# Patient Record
Sex: FEMALE | URBAN - METROPOLITAN AREA
[De-identification: names, ages, dates, MRNs, and addresses within clinical notes are randomized per-mention and may not be internally consistent; named-entity substitution may affect disease eponyms.]

---

## 2023-03-27 ENCOUNTER — TELEPHONE (OUTPATIENT)
Dept: PAIN MANAGEMENT | Age: 53
End: 2023-03-27

## 2023-04-07 ENCOUNTER — OFFICE VISIT (OUTPATIENT)
Dept: PAIN MANAGEMENT | Age: 53
End: 2023-04-07
Payer: COMMERCIAL

## 2023-04-07 VITALS
BODY MASS INDEX: 20.83 KG/M2 | TEMPERATURE: 97.5 F | DIASTOLIC BLOOD PRESSURE: 80 MMHG | SYSTOLIC BLOOD PRESSURE: 136 MMHG | HEIGHT: 65 IN | OXYGEN SATURATION: 100 % | WEIGHT: 125 LBS | HEART RATE: 94 BPM

## 2023-04-07 DIAGNOSIS — M47.817 LUMBOSACRAL SPONDYLOSIS WITHOUT MYELOPATHY: ICD-10-CM

## 2023-04-07 DIAGNOSIS — M17.12 PRIMARY OSTEOARTHRITIS OF LEFT KNEE: Primary | ICD-10-CM

## 2023-04-07 PROCEDURE — 99213 OFFICE O/P EST LOW 20 MIN: CPT | Performed by: PAIN MEDICINE

## 2023-04-07 PROCEDURE — 3017F COLORECTAL CA SCREEN DOC REV: CPT | Performed by: PAIN MEDICINE

## 2023-04-07 PROCEDURE — G8427 DOCREV CUR MEDS BY ELIG CLIN: HCPCS | Performed by: PAIN MEDICINE

## 2023-04-07 PROCEDURE — 1036F TOBACCO NON-USER: CPT | Performed by: PAIN MEDICINE

## 2023-04-07 PROCEDURE — G8420 CALC BMI NORM PARAMETERS: HCPCS | Performed by: PAIN MEDICINE

## 2023-04-07 RX ORDER — ALBUTEROL SULFATE 90 UG/1
AEROSOL, METERED RESPIRATORY (INHALATION)
COMMUNITY
Start: 2023-03-11

## 2023-04-07 RX ORDER — MIRTAZAPINE 30 MG/1
1 TABLET, FILM COATED ORAL DAILY
COMMUNITY
Start: 2019-04-19

## 2023-04-07 RX ORDER — SENNOSIDES 8.6 MG
650 CAPSULE ORAL EVERY 8 HOURS PRN
COMMUNITY
Start: 2023-03-15

## 2023-04-07 RX ORDER — PERPHENAZINE 16 MG
TABLET ORAL
COMMUNITY
Start: 2023-03-28

## 2023-04-07 RX ORDER — GABAPENTIN 400 MG/1
CAPSULE ORAL
COMMUNITY
Start: 2023-03-20

## 2023-04-07 RX ORDER — AMLODIPINE BESYLATE 5 MG/1
5 TABLET ORAL DAILY
COMMUNITY
Start: 2022-05-18

## 2023-04-07 RX ORDER — IBUPROFEN 800 MG/1
TABLET ORAL
COMMUNITY
Start: 2022-09-26

## 2023-04-07 RX ORDER — LISINOPRIL 40 MG/1
40 TABLET ORAL DAILY
COMMUNITY
Start: 2022-11-18

## 2023-04-07 RX ORDER — SERTRALINE HYDROCHLORIDE 100 MG/1
100 TABLET, FILM COATED ORAL DAILY
COMMUNITY
Start: 2022-08-19

## 2023-04-07 RX ORDER — ATORVASTATIN CALCIUM 40 MG/1
40 TABLET, FILM COATED ORAL DAILY
COMMUNITY
Start: 2022-05-18

## 2023-04-07 RX ORDER — ALPRAZOLAM 1 MG/1
TABLET ORAL
COMMUNITY
Start: 2023-03-16

## 2023-04-07 RX ORDER — ONDANSETRON 4 MG/1
TABLET, ORALLY DISINTEGRATING ORAL
COMMUNITY
Start: 2023-02-14

## 2023-04-07 RX ORDER — BUDESONIDE AND FORMOTEROL FUMARATE DIHYDRATE 160; 4.5 UG/1; UG/1
AEROSOL RESPIRATORY (INHALATION)
COMMUNITY
Start: 2022-03-25

## 2023-04-07 RX ORDER — LEVOFLOXACIN 500 MG/1
500 TABLET, FILM COATED ORAL DAILY
COMMUNITY
Start: 2023-02-23

## 2023-04-07 RX ORDER — METOPROLOL SUCCINATE 100 MG/1
100 TABLET, EXTENDED RELEASE ORAL DAILY
COMMUNITY
Start: 2022-04-12

## 2023-04-07 RX ORDER — CETIRIZINE HCL 1 MG/ML
10 SOLUTION, ORAL ORAL DAILY
COMMUNITY
Start: 2023-02-17

## 2023-04-07 ASSESSMENT — ENCOUNTER SYMPTOMS
RESPIRATORY NEGATIVE: 1
ALLERGIC/IMMUNOLOGIC NEGATIVE: 1
GASTROINTESTINAL NEGATIVE: 1
EYES NEGATIVE: 1

## 2023-04-07 NOTE — PROGRESS NOTES
movements intact. Conjunctiva/sclera: Conjunctivae normal.      Pupils: Pupils are equal, round, and reactive to light. Cardiovascular:      Rate and Rhythm: Normal rate and regular rhythm. Pulses: Normal pulses. Heart sounds: Normal heart sounds. Pulmonary:      Effort: Pulmonary effort is normal.      Breath sounds: Normal breath sounds. Abdominal:      General: Abdomen is flat. Bowel sounds are normal.      Palpations: Abdomen is soft. Musculoskeletal:         General: Normal range of motion. Cervical back: Normal range of motion and neck supple. Skin:     General: Skin is warm. Capillary Refill: Capillary refill takes less than 2 seconds. Neurological:      General: No focal deficit present. Mental Status: She is alert and oriented to person, place, and time. Mental status is at baseline. Psychiatric:         Mood and Affect: Mood normal.         Behavior: Behavior normal.         Thought Content: Thought content normal.         Judgment: Judgment normal.         Plan   Discussed combination of Narcotics and Xanax advised to stop thus and start small dose Narcotics. Discussed Knee joint injections streroids vs Gel injections.

## 2024-09-10 ENCOUNTER — OFFICE VISIT (OUTPATIENT)
Age: 54
End: 2024-09-10
Payer: COMMERCIAL

## 2024-09-10 VITALS
WEIGHT: 134.5 LBS | DIASTOLIC BLOOD PRESSURE: 74 MMHG | HEIGHT: 65 IN | BODY MASS INDEX: 22.41 KG/M2 | TEMPERATURE: 97.1 F | SYSTOLIC BLOOD PRESSURE: 136 MMHG

## 2024-09-10 DIAGNOSIS — M47.817 LUMBOSACRAL SPONDYLOSIS WITHOUT MYELOPATHY: ICD-10-CM

## 2024-09-10 DIAGNOSIS — M17.12 PRIMARY OSTEOARTHRITIS OF LEFT KNEE: Primary | ICD-10-CM

## 2024-09-10 DIAGNOSIS — M16.11 PRIMARY OSTEOARTHRITIS OF RIGHT HIP: ICD-10-CM

## 2024-09-10 DIAGNOSIS — M46.1 BILATERAL SACROILIITIS (HCC): ICD-10-CM

## 2024-09-10 DIAGNOSIS — M17.11 OSTEOARTHRITIS OF RIGHT KNEE, UNSPECIFIED OSTEOARTHRITIS TYPE: ICD-10-CM

## 2024-09-10 DIAGNOSIS — M21.851 HIP DYSPLASIA, ACQUIRED, RIGHT: ICD-10-CM

## 2024-09-10 PROCEDURE — 20610 DRAIN/INJ JOINT/BURSA W/O US: CPT | Performed by: PAIN MEDICINE

## 2024-09-10 PROCEDURE — G8427 DOCREV CUR MEDS BY ELIG CLIN: HCPCS | Performed by: PAIN MEDICINE

## 2024-09-10 PROCEDURE — 99213 OFFICE O/P EST LOW 20 MIN: CPT | Performed by: PAIN MEDICINE

## 2024-09-10 PROCEDURE — 99215 OFFICE O/P EST HI 40 MIN: CPT

## 2024-09-10 PROCEDURE — G8420 CALC BMI NORM PARAMETERS: HCPCS | Performed by: PAIN MEDICINE

## 2024-09-10 PROCEDURE — 1036F TOBACCO NON-USER: CPT | Performed by: PAIN MEDICINE

## 2024-09-10 PROCEDURE — 3017F COLORECTAL CA SCREEN DOC REV: CPT | Performed by: PAIN MEDICINE

## 2024-09-10 RX ORDER — BUPIVACAINE HYDROCHLORIDE 5 MG/ML
30 INJECTION, SOLUTION PERINEURAL ONCE
Status: COMPLETED | OUTPATIENT
Start: 2024-09-10 | End: 2024-09-10

## 2024-09-10 RX ORDER — PROMETHAZINE HYDROCHLORIDE 25 MG/1
25 TABLET ORAL EVERY 6 HOURS PRN
COMMUNITY
Start: 2024-02-24

## 2024-09-10 RX ORDER — TRIAMCINOLONE ACETONIDE 40 MG/ML
40 INJECTION, SUSPENSION INTRA-ARTICULAR; INTRAMUSCULAR ONCE
Status: COMPLETED | OUTPATIENT
Start: 2024-09-10 | End: 2024-09-10

## 2024-09-10 RX ORDER — PANTOPRAZOLE SODIUM 40 MG/1
40 TABLET, DELAYED RELEASE ORAL 2 TIMES DAILY
COMMUNITY
Start: 2024-03-28

## 2024-09-10 RX ORDER — GABAPENTIN 600 MG/1
600 TABLET ORAL
COMMUNITY
Start: 2024-08-04

## 2024-09-10 RX ORDER — IPRATROPIUM BROMIDE AND ALBUTEROL SULFATE 2.5; .5 MG/3ML; MG/3ML
3 SOLUTION RESPIRATORY (INHALATION) EVERY 6 HOURS PRN
COMMUNITY
Start: 2024-06-12

## 2024-09-10 RX ORDER — ONDANSETRON 4 MG/1
4 TABLET, FILM COATED ORAL EVERY 8 HOURS PRN
COMMUNITY
Start: 2024-07-18

## 2024-09-10 RX ORDER — GABAPENTIN 600 MG/1
1200 TABLET ORAL 3 TIMES DAILY
Qty: 180 TABLET | Refills: 5 | Status: SHIPPED | OUTPATIENT
Start: 2024-09-10 | End: 2024-10-10

## 2024-09-10 RX ORDER — OXYCODONE AND ACETAMINOPHEN 5; 325 MG/1; MG/1
1 TABLET ORAL EVERY 8 HOURS PRN
Qty: 21 TABLET | Refills: 0 | Status: SHIPPED | OUTPATIENT
Start: 2024-09-10 | End: 2024-09-17

## 2024-09-10 RX ORDER — METHOCARBAMOL 750 MG/1
TABLET, FILM COATED ORAL
COMMUNITY

## 2024-09-10 RX ORDER — GABAPENTIN 400 MG/1
CAPSULE ORAL
Qty: 90 CAPSULE | Status: CANCELLED | OUTPATIENT
Start: 2024-09-10

## 2024-09-10 RX ORDER — AZITHROMYCIN 250 MG/1
TABLET, FILM COATED ORAL
COMMUNITY
Start: 2024-07-23

## 2024-09-10 RX ORDER — FLUTICASONE PROPIONATE 50 MCG
SPRAY, SUSPENSION (ML) NASAL
COMMUNITY
Start: 2024-06-28

## 2024-09-10 RX ADMIN — TRIAMCINOLONE ACETONIDE 40 MG: 40 INJECTION, SUSPENSION INTRA-ARTICULAR; INTRAMUSCULAR at 11:25

## 2024-09-10 RX ADMIN — BUPIVACAINE HYDROCHLORIDE 150 MG: 5 INJECTION, SOLUTION PERINEURAL at 11:24

## 2024-09-10 ASSESSMENT — ENCOUNTER SYMPTOMS
GASTROINTESTINAL NEGATIVE: 1
RESPIRATORY NEGATIVE: 1
ALLERGIC/IMMUNOLOGIC NEGATIVE: 1
EYES NEGATIVE: 1

## 2024-09-17 ENCOUNTER — OFFICE VISIT (OUTPATIENT)
Age: 54
End: 2024-09-17
Payer: COMMERCIAL

## 2024-09-17 VITALS
TEMPERATURE: 97.3 F | HEIGHT: 65 IN | BODY MASS INDEX: 22.33 KG/M2 | DIASTOLIC BLOOD PRESSURE: 110 MMHG | WEIGHT: 134 LBS | SYSTOLIC BLOOD PRESSURE: 168 MMHG

## 2024-09-17 DIAGNOSIS — M16.11 PRIMARY OSTEOARTHRITIS OF RIGHT HIP: ICD-10-CM

## 2024-09-17 DIAGNOSIS — M21.851 HIP DYSPLASIA, ACQUIRED, RIGHT: ICD-10-CM

## 2024-09-17 DIAGNOSIS — M17.12 PRIMARY OSTEOARTHRITIS OF LEFT KNEE: ICD-10-CM

## 2024-09-17 DIAGNOSIS — M47.817 LUMBOSACRAL SPONDYLOSIS WITHOUT MYELOPATHY: ICD-10-CM

## 2024-09-17 DIAGNOSIS — M17.11 OSTEOARTHRITIS OF RIGHT KNEE, UNSPECIFIED OSTEOARTHRITIS TYPE: ICD-10-CM

## 2024-09-17 PROCEDURE — 99213 OFFICE O/P EST LOW 20 MIN: CPT | Performed by: PAIN MEDICINE

## 2024-09-17 PROCEDURE — 99214 OFFICE O/P EST MOD 30 MIN: CPT

## 2024-09-17 PROCEDURE — 3017F COLORECTAL CA SCREEN DOC REV: CPT | Performed by: PAIN MEDICINE

## 2024-09-17 PROCEDURE — G8427 DOCREV CUR MEDS BY ELIG CLIN: HCPCS | Performed by: PAIN MEDICINE

## 2024-09-17 PROCEDURE — 1036F TOBACCO NON-USER: CPT | Performed by: PAIN MEDICINE

## 2024-09-17 PROCEDURE — G8420 CALC BMI NORM PARAMETERS: HCPCS | Performed by: PAIN MEDICINE

## 2024-09-17 RX ORDER — OXYCODONE AND ACETAMINOPHEN 5; 325 MG/1; MG/1
1 TABLET ORAL EVERY 8 HOURS PRN
Qty: 42 TABLET | Refills: 0 | Status: SHIPPED | OUTPATIENT
Start: 2024-09-17 | End: 2024-10-01

## 2024-09-17 ASSESSMENT — ENCOUNTER SYMPTOMS
GASTROINTESTINAL NEGATIVE: 1
EYES NEGATIVE: 1
RESPIRATORY NEGATIVE: 1
ALLERGIC/IMMUNOLOGIC NEGATIVE: 1

## 2024-09-27 ENCOUNTER — OFFICE VISIT (OUTPATIENT)
Age: 54
End: 2024-09-27
Payer: COMMERCIAL

## 2024-09-27 VITALS
DIASTOLIC BLOOD PRESSURE: 88 MMHG | SYSTOLIC BLOOD PRESSURE: 140 MMHG | WEIGHT: 132 LBS | BODY MASS INDEX: 21.99 KG/M2 | HEIGHT: 65 IN

## 2024-09-27 DIAGNOSIS — M96.1 LUMBAR POST-LAMINECTOMY SYNDROME: ICD-10-CM

## 2024-09-27 DIAGNOSIS — M47.817 LUMBOSACRAL SPONDYLOSIS WITHOUT MYELOPATHY: ICD-10-CM

## 2024-09-27 DIAGNOSIS — M47.812 CERVICAL SPONDYLOSIS WITHOUT MYELOPATHY: Primary | ICD-10-CM

## 2024-09-27 DIAGNOSIS — M54.16 LUMBAR RADICULOPATHY: ICD-10-CM

## 2024-09-27 DIAGNOSIS — M51.36 DDD (DEGENERATIVE DISC DISEASE), LUMBAR: ICD-10-CM

## 2024-09-27 PROCEDURE — 3017F COLORECTAL CA SCREEN DOC REV: CPT | Performed by: PAIN MEDICINE

## 2024-09-27 PROCEDURE — 99215 OFFICE O/P EST HI 40 MIN: CPT

## 2024-09-27 PROCEDURE — G8420 CALC BMI NORM PARAMETERS: HCPCS | Performed by: PAIN MEDICINE

## 2024-09-27 PROCEDURE — 99213 OFFICE O/P EST LOW 20 MIN: CPT | Performed by: PAIN MEDICINE

## 2024-09-27 PROCEDURE — 1036F TOBACCO NON-USER: CPT | Performed by: PAIN MEDICINE

## 2024-09-27 PROCEDURE — G8427 DOCREV CUR MEDS BY ELIG CLIN: HCPCS | Performed by: PAIN MEDICINE

## 2024-09-27 RX ORDER — OXYCODONE HYDROCHLORIDE 5 MG/1
5 TABLET ORAL EVERY 6 HOURS PRN
Qty: 21 TABLET | Refills: 0 | Status: SHIPPED | OUTPATIENT
Start: 2024-09-27 | End: 2024-10-04

## 2024-09-27 ASSESSMENT — ENCOUNTER SYMPTOMS
EYES NEGATIVE: 1
GASTROINTESTINAL NEGATIVE: 1
RESPIRATORY NEGATIVE: 1
ALLERGIC/IMMUNOLOGIC NEGATIVE: 1

## 2024-10-04 ENCOUNTER — OFFICE VISIT (OUTPATIENT)
Age: 54
End: 2024-10-04
Payer: COMMERCIAL

## 2024-10-04 VITALS
TEMPERATURE: 97.7 F | DIASTOLIC BLOOD PRESSURE: 74 MMHG | WEIGHT: 132 LBS | HEIGHT: 65 IN | SYSTOLIC BLOOD PRESSURE: 126 MMHG | BODY MASS INDEX: 21.99 KG/M2

## 2024-10-04 DIAGNOSIS — M54.16 LUMBAR RADICULOPATHY: ICD-10-CM

## 2024-10-04 DIAGNOSIS — M47.817 LUMBOSACRAL SPONDYLOSIS WITHOUT MYELOPATHY: ICD-10-CM

## 2024-10-04 DIAGNOSIS — M47.812 CERVICAL SPONDYLOSIS WITHOUT MYELOPATHY: ICD-10-CM

## 2024-10-04 DIAGNOSIS — M96.1 LUMBAR POST-LAMINECTOMY SYNDROME: ICD-10-CM

## 2024-10-04 PROCEDURE — 99213 OFFICE O/P EST LOW 20 MIN: CPT | Performed by: PAIN MEDICINE

## 2024-10-04 PROCEDURE — 3017F COLORECTAL CA SCREEN DOC REV: CPT | Performed by: PAIN MEDICINE

## 2024-10-04 PROCEDURE — G8427 DOCREV CUR MEDS BY ELIG CLIN: HCPCS | Performed by: PAIN MEDICINE

## 2024-10-04 PROCEDURE — G8420 CALC BMI NORM PARAMETERS: HCPCS | Performed by: PAIN MEDICINE

## 2024-10-04 PROCEDURE — 1036F TOBACCO NON-USER: CPT | Performed by: PAIN MEDICINE

## 2024-10-04 PROCEDURE — G8484 FLU IMMUNIZE NO ADMIN: HCPCS | Performed by: PAIN MEDICINE

## 2024-10-04 PROCEDURE — 99214 OFFICE O/P EST MOD 30 MIN: CPT | Performed by: PAIN MEDICINE

## 2024-10-04 RX ORDER — OXYCODONE HYDROCHLORIDE 5 MG/1
5 TABLET ORAL EVERY 6 HOURS PRN
Qty: 84 TABLET | Refills: 0 | Status: SHIPPED | OUTPATIENT
Start: 2024-10-04 | End: 2024-11-01

## 2024-10-04 NOTE — PROGRESS NOTES
HOURS AS NEEDED AS DIRECTED      ondansetron (ZOFRAN) 4 MG tablet Take 1 tablet by mouth every 8 hours as needed for Nausea for nausea      pantoprazole (PROTONIX) 40 MG tablet Take 1 tablet by mouth 2 times daily      promethazine (PHENERGAN) 25 MG tablet Take 1 tablet by mouth every 6 hours as needed      gabapentin (NEURONTIN) 600 MG tablet 1 tablet.      gabapentin (NEURONTIN) 600 MG tablet Take 2 tablets by mouth 3 times daily for 30 days. 180 tablet 5    acetaminophen (TYLENOL) 650 MG extended release tablet Take 1 tablet by mouth every 8 hours as needed      VENTOLIN  (90 Base) MCG/ACT inhaler INHALE 2 PUFFS INTO THE LUNGS EVERY 4 TO 6 HOURS AS NEEDED      Alpha-Lipoic Acid 600 MG CAPS       ALPRAZolam (XANAX) 1 MG tablet TAKE 1 TABLET BY MOUTH THREE TIMES A DAY AS NEEDED      amLODIPine (NORVASC) 5 MG tablet Take 1 tablet by mouth daily      atorvastatin (LIPITOR) 40 MG tablet Take 1 tablet by mouth daily      budesonide-formoterol (SYMBICORT) 160-4.5 MCG/ACT AERO Inhale 2 puff as directed twice a day      ZYRTEC ALLERGY 10 MG tablet Take 1 tablet by mouth daily      Cholecalciferol (VITAMIN D3) 125 MCG (5000 UT) CAPS Take 1 capsule by mouth daily      gabapentin (NEURONTIN) 400 MG capsule TAKE 2 CAPSULES BY MOUTH THREE TIMES DAILY FOR 30 DAYS.      ibuprofen (ADVIL;MOTRIN) 800 MG tablet Take 1 tablet by mouth every six to eight hours as needed      levoFLOXacin (LEVAQUIN) 500 MG tablet Take 1 tablet by mouth daily      lisinopril (PRINIVIL;ZESTRIL) 40 MG tablet Take 1 tablet by mouth daily      metoprolol succinate (TOPROL XL) 100 MG extended release tablet Take 1 tablet by mouth daily      mirtazapine (REMERON) 30 MG tablet Take 1 tablet by mouth daily      nicotine (NICODERM CQ) 7 MG/24HR Place 1 patch onto the skin every 24 hours      ondansetron (ZOFRAN-ODT) 4 MG disintegrating tablet TAKE 1 TABLET BY MOUTH EVERY 6 HOURS AS NEEDED FOR NAUSEA AND VOMITING FOR UP TO 7 DAYS      sertraline (ZOLOFT)

## 2024-11-01 ENCOUNTER — PATIENT MESSAGE (OUTPATIENT)
Age: 54
End: 2024-11-01

## 2024-11-01 DIAGNOSIS — M96.1 LUMBAR POST-LAMINECTOMY SYNDROME: ICD-10-CM

## 2024-11-01 DIAGNOSIS — M47.812 CERVICAL SPONDYLOSIS WITHOUT MYELOPATHY: ICD-10-CM

## 2024-11-01 DIAGNOSIS — M54.16 LUMBAR RADICULOPATHY: ICD-10-CM

## 2024-11-01 DIAGNOSIS — M47.817 LUMBOSACRAL SPONDYLOSIS WITHOUT MYELOPATHY: ICD-10-CM

## 2024-11-01 RX ORDER — OXYCODONE HYDROCHLORIDE 5 MG/1
5 TABLET ORAL 4 TIMES DAILY PRN
Qty: 20 TABLET | Refills: 0 | Status: SHIPPED | OUTPATIENT
Start: 2024-11-01 | End: 2024-11-04 | Stop reason: SDUPTHER

## 2024-11-01 RX ORDER — OXYCODONE HYDROCHLORIDE 5 MG/1
5 TABLET ORAL 4 TIMES DAILY PRN
Qty: 20 TABLET | Refills: 0 | Status: CANCELLED | OUTPATIENT
Start: 2024-11-01 | End: 2024-11-06

## 2024-11-01 NOTE — TELEPHONE ENCOUNTER
Dr. Carolina note reviewed 10/4/2024, stopped Xanax  [place] reviewed, given oxycodone 5 mg #84 for 21 days.  Last benzodiazepine refill 8/14/2024  Appointment canceled Dr. Carolina on Friday 11/1/2024  Appointment rescheduled for Tuesday, 11/5/2024 (Dr. Carolina out of office)  [place] shows over 20 prescribers of controlled substances, overdose risk score 480 above average    -Opioid reduction to get through until next appointment  -Continue Oxycodone 5 mg QID, 11/1-11/6/24, 5 days, #20 no refills  -Will follow closely    Controlled Substance Monitoring:    Acute and Chronic Pain Monitoring:   RX Monitoring Periodic Controlled Substance Monitoring   11/1/2024   5:02 PM Obtaining appropriate analgesic effect of treatment.

## 2024-11-01 NOTE — TELEPHONE ENCOUNTER
Requested Prescriptions     Pending Prescriptions Disp Refills    oxyCODONE (ROXICODONE) 5 MG immediate release tablet 84 tablet 0     Sig: Take 1 tablet by mouth every 6 hours as needed for Pain for up to 28 days. Intended supply: 3 days. Take lowest dose possible to manage pain Max Daily Amount: 20 mg       Patient last seen on:  10/4    Patient is requesting a short supply for her percocet she had to r/s appt because she is in hospital  and will be out today   Reason for request:  patient will be out  of med after today before her visit on 11/5     Request date for pharmacy pick-up:  11/1    Next office visit date: 11/5/2024    Beeswax, Cortisone acetate, Hydrocortisone, Morphine, and Penicillins

## 2024-11-04 ENCOUNTER — TELEPHONE (OUTPATIENT)
Age: 54
End: 2024-11-04

## 2024-11-04 RX ORDER — OXYCODONE HYDROCHLORIDE 5 MG/1
5 TABLET ORAL EVERY 8 HOURS PRN
Qty: 42 TABLET | Refills: 0 | Status: SHIPPED | OUTPATIENT
Start: 2024-11-04 | End: 2024-11-18

## 2024-11-04 NOTE — TELEPHONE ENCOUNTER
Pt called in stating that the Oxycodone was called into the wrong pharmacy it needs to go to [facility] which is her normal pharmacy. She is not in hospital any more.

## 2024-11-04 NOTE — TELEPHONE ENCOUNTER
Requested Prescriptions     Pending Prescriptions Disp Refills    oxyCODONE (ROXICODONE) 5 MG immediate release tablet 20 tablet 0     Sig: Take 1 tablet by mouth 4 times daily as needed for Pain for up to 5 days. Max Daily Amount: 20 mg       Patient last seen on: 10/04/24    Date of last surgery: N/A    Date of last refill: 10/04/24    Reason for request: MyChart requested. Prescription sent 11/01/24 was cancelled by pharmacy. Please resend    Request date for pharmacy pick-up: 11/04/24    Next office visit date: 11/5/2024    Beeswax, Cortisone acetate, Hydrocortisone, Morphine, and Penicillins

## 2024-11-04 NOTE — TELEPHONE ENCOUNTER
Medication needs to be sent to [facility]- not to the [facility] pharmacy.

## 2024-11-18 DIAGNOSIS — M96.1 LUMBAR POST-LAMINECTOMY SYNDROME: ICD-10-CM

## 2024-11-18 DIAGNOSIS — M47.817 LUMBOSACRAL SPONDYLOSIS WITHOUT MYELOPATHY: ICD-10-CM

## 2024-11-18 DIAGNOSIS — M54.16 LUMBAR RADICULOPATHY: ICD-10-CM

## 2024-11-18 DIAGNOSIS — M47.812 CERVICAL SPONDYLOSIS WITHOUT MYELOPATHY: ICD-10-CM

## 2024-11-18 NOTE — TELEPHONE ENCOUNTER
Requested Prescriptions     Pending Prescriptions Disp Refills    oxyCODONE (ROXICODONE) 5 MG immediate release tablet 42 tablet 0     Sig: Take 1 tablet by mouth every 8 hours as needed for Pain for up to 14 days. Max Daily Amount: 15 mg       Patient last seen on:  10/04/2024    Date of last surgery:  N/A    Date of last refill:  11/4/2024    Reason for request:  COULD NOT MAKE APPT.    Request date for pharmacy pick-up:  11/18/2024    Next office visit date: 11/25/2024    Beeswax, Cortisone acetate, Hydrocortisone, Morphine, and Penicillins

## 2024-11-19 RX ORDER — OXYCODONE HYDROCHLORIDE 5 MG/1
5 TABLET ORAL EVERY 8 HOURS PRN
Qty: 21 TABLET | Refills: 0 | Status: CANCELLED | OUTPATIENT
Start: 2024-11-19 | End: 2024-11-26

## 2024-11-19 NOTE — TELEPHONE ENCOUNTER
Requested Prescriptions     Pending Prescriptions Disp Refills    oxyCODONE (ROXICODONE) 5 MG immediate release tablet 21 tablet 0     Sig: Take 1 tablet by mouth every 8 hours as needed for Pain for up to 7 days. Max Daily Amount: 15 mg       Patient is requesting a short supply of 7 days until she comes in to see you on 11/25.  This is your first opening available please advise.  Patient has been very sick.       Next office visit date: 11/25/2024    Beeswax, Cortisone acetate, Hydrocortisone, Morphine, and Penicillins

## 2024-11-20 NOTE — TELEPHONE ENCOUNTER
Patient is aware and patient said she is starting to go through withdraws and would like the 7 day short supply of the oxycodone and the gabepentin.  If you can advise what time we can fit her in today or Friday ??    Please advise

## 2024-11-25 ENCOUNTER — OFFICE VISIT (OUTPATIENT)
Age: 54
End: 2024-11-25
Payer: COMMERCIAL

## 2024-11-25 VITALS
BODY MASS INDEX: 21.34 KG/M2 | TEMPERATURE: 97.4 F | DIASTOLIC BLOOD PRESSURE: 100 MMHG | HEIGHT: 64 IN | WEIGHT: 125 LBS | SYSTOLIC BLOOD PRESSURE: 170 MMHG

## 2024-11-25 DIAGNOSIS — M47.817 LUMBOSACRAL SPONDYLOSIS WITHOUT MYELOPATHY: ICD-10-CM

## 2024-11-25 DIAGNOSIS — M47.812 CERVICAL SPONDYLOSIS WITHOUT MYELOPATHY: ICD-10-CM

## 2024-11-25 DIAGNOSIS — M96.1 LUMBAR POST-LAMINECTOMY SYNDROME: ICD-10-CM

## 2024-11-25 DIAGNOSIS — M54.16 LUMBAR RADICULOPATHY: ICD-10-CM

## 2024-11-25 DIAGNOSIS — M46.1 BILATERAL SACROILIITIS (HCC): Primary | ICD-10-CM

## 2024-11-25 PROCEDURE — 1036F TOBACCO NON-USER: CPT | Performed by: PAIN MEDICINE

## 2024-11-25 PROCEDURE — 99213 OFFICE O/P EST LOW 20 MIN: CPT | Performed by: PAIN MEDICINE

## 2024-11-25 PROCEDURE — 99215 OFFICE O/P EST HI 40 MIN: CPT | Performed by: PAIN MEDICINE

## 2024-11-25 PROCEDURE — G8427 DOCREV CUR MEDS BY ELIG CLIN: HCPCS | Performed by: PAIN MEDICINE

## 2024-11-25 PROCEDURE — G8484 FLU IMMUNIZE NO ADMIN: HCPCS | Performed by: PAIN MEDICINE

## 2024-11-25 PROCEDURE — 3017F COLORECTAL CA SCREEN DOC REV: CPT | Performed by: PAIN MEDICINE

## 2024-11-25 PROCEDURE — G8420 CALC BMI NORM PARAMETERS: HCPCS | Performed by: PAIN MEDICINE

## 2024-11-25 RX ORDER — OXYCODONE HYDROCHLORIDE 5 MG/1
5 TABLET ORAL EVERY 8 HOURS PRN
Qty: 42 TABLET | Refills: 0 | Status: SHIPPED | OUTPATIENT
Start: 2024-11-25 | End: 2024-12-09

## 2024-11-25 NOTE — PROGRESS NOTES
History of Present Illness     Patient Identification  Emily Howard is a 53 y.o. female.    Patient information was obtained from patient.      Chief Complaint   Chief Complaint   Patient presents with    Back Pain    Leg Pain     Left leg    Hip Pain    Hand Pain     Right hand       Patient 53-year-old female with a history of COPD, cancer, asthma, anxiety  known to me complaint of back pain to her rt foot  and bilateral knees. Had an admit 2 weeks ago with a TIA. This is a result of no known injury. Onset of pain was several years ago and has been unchanged since. The pain is located in diffuse lower back, described as aching, dull, and stabbing and rated as moderate and severe, without radiation. Symptoms include incontinence of bowels. Occational weakness because of balance issues Occationaly her bladder   The patient denies other injuries. Care prior to arrival consisted of NSAID and acetaminophen with minimal relief.  Seen Ortho cannot have surgery as she cannot  have some one to take care of Her. Started Oxycodone with GI Side effects was admitted to hospital she attributes to Tylenol. Having tough time doing her Job.    History reviewed. No pertinent past medical history.  History reviewed. No pertinent family history.  Current Outpatient Medications   Medication Sig Dispense Refill    oxyCODONE (ROXICODONE) 5 MG immediate release tablet Take 1 tablet by mouth every 8 hours as needed for Pain for up to 14 days. Max Daily Amount: 15 mg 42 tablet 0    oxyCODONE (ROXICODONE) 5 MG immediate release tablet Take 1 tablet by mouth every 8 hours as needed for Pain for up to 14 days. Intended supply: 3 days. Take lowest dose possible to manage pain Max Daily Amount: 15 mg 42 tablet 0    azithromycin (ZITHROMAX) 250 MG tablet TAKE 2 TABLETS BY MOUTH TODAY, THEN TAKE 1 TABLET DAILY FOR 4 DAYS AS DIRECTED      fluticasone (FLONASE) 50 MCG/ACT nasal spray SPRAY 2 SPRAY INTO BOTH NOSTRILS ONCE A DAY INSTILL 2 SPRAYS INTO

## 2024-12-02 ENCOUNTER — TELEPHONE (OUTPATIENT)
Age: 54
End: 2024-12-02

## 2024-12-02 NOTE — TELEPHONE ENCOUNTER
REFERRAL # [id number]    BILAT SI JOINT INJ     AUTH FROM 11/27/24-2/27/25    OK to schedule procedure approved as above.   Please note sides/levels approved and date range.   (If applicable, sides/levels approved may differ from those ordered)    TO BE SCHEDULED WITH DR DANIEL

## 2024-12-02 NOTE — TELEPHONE ENCOUNTER
Attempted to contact mailbox is full, unable to leave message.   KETTY SI JOINT INJ      AUTH FROM 11/27/24-2/27/25

## 2024-12-04 NOTE — TELEPHONE ENCOUNTER
Sent my chart message to patient to call office to schedule.   KETTY SI JOINT INJ      AUTH FROM 11/27/24-2/27/25

## 2024-12-09 ENCOUNTER — OFFICE VISIT (OUTPATIENT)
Age: 54
End: 2024-12-09
Payer: COMMERCIAL

## 2024-12-09 VITALS
HEIGHT: 65 IN | BODY MASS INDEX: 20.99 KG/M2 | SYSTOLIC BLOOD PRESSURE: 142 MMHG | DIASTOLIC BLOOD PRESSURE: 90 MMHG | WEIGHT: 126 LBS | TEMPERATURE: 97.6 F

## 2024-12-09 DIAGNOSIS — M96.1 LUMBAR POST-LAMINECTOMY SYNDROME: ICD-10-CM

## 2024-12-09 DIAGNOSIS — M47.812 CERVICAL SPONDYLOSIS WITHOUT MYELOPATHY: ICD-10-CM

## 2024-12-09 DIAGNOSIS — M47.817 LUMBOSACRAL SPONDYLOSIS WITHOUT MYELOPATHY: ICD-10-CM

## 2024-12-09 DIAGNOSIS — M54.16 LUMBAR RADICULOPATHY: ICD-10-CM

## 2024-12-09 PROCEDURE — 1036F TOBACCO NON-USER: CPT | Performed by: PAIN MEDICINE

## 2024-12-09 PROCEDURE — 99214 OFFICE O/P EST MOD 30 MIN: CPT | Performed by: PAIN MEDICINE

## 2024-12-09 PROCEDURE — 99213 OFFICE O/P EST LOW 20 MIN: CPT | Performed by: PAIN MEDICINE

## 2024-12-09 PROCEDURE — G8427 DOCREV CUR MEDS BY ELIG CLIN: HCPCS | Performed by: PAIN MEDICINE

## 2024-12-09 PROCEDURE — G8420 CALC BMI NORM PARAMETERS: HCPCS | Performed by: PAIN MEDICINE

## 2024-12-09 PROCEDURE — 3017F COLORECTAL CA SCREEN DOC REV: CPT | Performed by: PAIN MEDICINE

## 2024-12-09 PROCEDURE — G8484 FLU IMMUNIZE NO ADMIN: HCPCS | Performed by: PAIN MEDICINE

## 2024-12-09 RX ORDER — OXYCODONE HYDROCHLORIDE 5 MG/1
5 TABLET ORAL EVERY 8 HOURS PRN
Qty: 42 TABLET | Refills: 0 | Status: SHIPPED | OUTPATIENT
Start: 2024-12-09 | End: 2024-12-19 | Stop reason: SDUPTHER

## 2024-12-09 RX ORDER — QUETIAPINE FUMARATE 50 MG/1
50 TABLET, EXTENDED RELEASE ORAL NIGHTLY
COMMUNITY
Start: 2024-11-26

## 2024-12-09 NOTE — PROGRESS NOTES
History of Present Illness     Patient Identification  Emily Howard is a 53 y.o. female.    Patient information was obtained from patient.      Chief Complaint   Chief Complaint   Patient presents with    Follow-up    Back Pain    Pain     General, \"head to toe\"       Patient 53-year-old female with a history of COPD, cancer, asthma, anxiety  known to me complaint of back pain to her rt foot  and bilateral knees. Had a fall by slipping on Garbage had to go to ED and was given a toradol shot. Had an admit 2 weeks ago with a TIA. This is a result of no known injury. Onset of pain was several years ago and has been unchanged since. The pain is located in diffuse lower back, described as aching, dull, and stabbing and rated as moderate and severe, without radiation. Symptoms include incontinence of bowels. Occational weakness because of balance issues Occationaly her bladder   The patient denies other injuries. Care prior to arrival consisted of NSAID and acetaminophen with minimal relief.  Seen Ortho cannot have surgery as she cannot  have some one to take care of Her. Started Oxycodone with GI Side effects was admitted to hospital she attributes to Tylenol. Having tough time doing her Job.    History reviewed. No pertinent past medical history.  History reviewed. No pertinent family history.  Current Outpatient Medications   Medication Sig Dispense Refill    QUEtiapine (SEROQUEL XR) 50 MG extended release tablet Take 1 tablet by mouth nightly      oxyCODONE (ROXICODONE) 5 MG immediate release tablet Take 1 tablet by mouth every 8 hours as needed for Pain for up to 14 days. Intended supply: 3 days. Take lowest dose possible to manage pain Max Daily Amount: 15 mg 42 tablet 0    azithromycin (ZITHROMAX) 250 MG tablet TAKE 2 TABLETS BY MOUTH TODAY, THEN TAKE 1 TABLET DAILY FOR 4 DAYS AS DIRECTED      fluticasone (FLONASE) 50 MCG/ACT nasal spray SPRAY 2 SPRAY INTO BOTH NOSTRILS ONCE A DAY INSTILL 2 SPRAYS INTO EACH NOSTRIL

## 2024-12-19 ENCOUNTER — TELEPHONE (OUTPATIENT)
Age: 54
End: 2024-12-19

## 2024-12-19 ENCOUNTER — PROCEDURE VISIT (OUTPATIENT)
Age: 54
End: 2024-12-19
Payer: COMMERCIAL

## 2024-12-19 VITALS
TEMPERATURE: 97.8 F | OXYGEN SATURATION: 97 % | SYSTOLIC BLOOD PRESSURE: 158 MMHG | HEART RATE: 85 BPM | DIASTOLIC BLOOD PRESSURE: 84 MMHG

## 2024-12-19 DIAGNOSIS — M46.1 BILATERAL SACROILIITIS (HCC): Primary | ICD-10-CM

## 2024-12-19 RX ORDER — BUPIVACAINE HYDROCHLORIDE 5 MG/ML
2 INJECTION, SOLUTION EPIDURAL; INTRACAUDAL ONCE
Status: COMPLETED | OUTPATIENT
Start: 2024-12-19 | End: 2024-12-19

## 2024-12-19 RX ORDER — TRIAMCINOLONE ACETONIDE 40 MG/ML
40 INJECTION, SUSPENSION INTRA-ARTICULAR; INTRAMUSCULAR ONCE
Status: COMPLETED | OUTPATIENT
Start: 2024-12-19 | End: 2024-12-19

## 2024-12-19 RX ORDER — OXYCODONE HYDROCHLORIDE 5 MG/1
5 TABLET ORAL EVERY 8 HOURS PRN
Qty: 42 TABLET | Refills: 0 | Status: SHIPPED | OUTPATIENT
Start: 2024-12-19 | End: 2025-01-02

## 2024-12-19 RX ORDER — OXYCODONE HYDROCHLORIDE 5 MG/1
5 TABLET ORAL EVERY 8 HOURS PRN
Qty: 42 TABLET | Refills: 0 | Status: SHIPPED | OUTPATIENT
Start: 2024-12-19 | End: 2024-12-19 | Stop reason: SDUPTHER

## 2024-12-19 RX ADMIN — BUPIVACAINE HYDROCHLORIDE 2 MG: 5 INJECTION, SOLUTION EPIDURAL; INTRACAUDAL at 13:32

## 2024-12-19 RX ADMIN — TRIAMCINOLONE ACETONIDE 40 MG: 40 INJECTION, SUSPENSION INTRA-ARTICULAR; INTRAMUSCULAR at 13:32

## 2024-12-19 ASSESSMENT — PAIN SCALES - GENERAL
PAINLEVEL_OUTOF10: 10
PAINLEVEL_OUTOF10: 10

## 2024-12-19 ASSESSMENT — ENCOUNTER SYMPTOMS
RESPIRATORY NEGATIVE: 1
GASTROINTESTINAL NEGATIVE: 1
EYES NEGATIVE: 1
ALLERGIC/IMMUNOLOGIC NEGATIVE: 1

## 2024-12-19 NOTE — TELEPHONE ENCOUNTER
PATIENT CALLED AND SHE NEEDS HER RX SENT TO [facility] IN [address]  DOES NOT HAVE TRANSPORTATION TO PICK IT UP IN [place].PLEASE ADVISE

## 2024-12-19 NOTE — PROGRESS NOTES
History of Present Illness     Patient Identification  Emily Howard is a 53 y.o. female.    Patient information was obtained from patient.      Chief Complaint   Chief Complaint   Patient presents with    Back Pain     Bilateral sacroiliac       Patient 53-year-old female here for bilateral SI Joint injections for low back pain, with a history of COPD, cancer, asthma, anxiety  known to me complaint of back pain to her rt foot  and bilateral knees. Had a fall by slipping on Garbage had to go to ED and was given a toradol shot. Had an admit 2 weeks ago with a TIA. This is a result of no known injury. Onset of pain was several years ago and has been unchanged since. The pain is located in diffuse lower back, described as aching, dull, and stabbing and rated as moderate and severe, without radiation. Symptoms include incontinence of bowels. Occational weakness because of balance issues Occationaly her bladder   The patient denies other injuries. Care prior to arrival consisted of NSAID and acetaminophen with minimal relief.  Seen Ortho cannot have surgery as she cannot  have some one to take care of Her. Started Oxycodone with GI Side effects was admitted to hospital she attributes to Tylenol. Having tough time doing her Job.    No past medical history on file.  No family history on file.  Current Outpatient Medications   Medication Sig Dispense Refill    QUEtiapine (SEROQUEL XR) 50 MG extended release tablet Take 1 tablet by mouth nightly      oxyCODONE (ROXICODONE) 5 MG immediate release tablet Take 1 tablet by mouth every 8 hours as needed for Pain for up to 14 days. Intended supply: 3 days. Take lowest dose possible to manage pain Max Daily Amount: 15 mg 42 tablet 0    azithromycin (ZITHROMAX) 250 MG tablet TAKE 2 TABLETS BY MOUTH TODAY, THEN TAKE 1 TABLET DAILY FOR 4 DAYS AS DIRECTED      fluticasone (FLONASE) 50 MCG/ACT nasal spray SPRAY 2 SPRAY INTO BOTH NOSTRILS ONCE A DAY INSTILL 2 SPRAYS INTO EACH NOSTRIL ONCE

## 2025-01-02 DIAGNOSIS — M46.1 BILATERAL SACROILIITIS (HCC): ICD-10-CM

## 2025-01-02 RX ORDER — OXYCODONE HYDROCHLORIDE 5 MG/1
5 TABLET ORAL EVERY 8 HOURS PRN
Qty: 42 TABLET | Refills: 0 | OUTPATIENT
Start: 2025-01-02 | End: 2025-01-16

## 2025-01-02 NOTE — TELEPHONE ENCOUNTER
Pt called back she is not able to do a follow up as she lives 1[place]5 hr away and would have to pay for [employer] to take her and would have to pay $85. Please Advise.

## 2025-01-02 NOTE — TELEPHONE ENCOUNTER
Patient was given a 2 week prescription again, patient is out of medication.  Patient stated her medication is suppose to be for a 1 month supply not 2 weeks and this is the 3rd time this has happened.    Please fill prescription asap  Requested Prescriptions     Pending Prescriptions Disp Refills    oxyCODONE (ROXICODONE) 5 MG immediate release tablet 42 tablet 0     Sig: Take 1 tablet by mouth every 8 hours as needed for Pain for up to 14 days. Intended supply: 3 days. Take lowest dose possible to manage pain Max Daily Amount: 15 mg       Patient last seen on:  12/199    Date of last surgery:  12/19    Date of last refill:  2 weeks ago    Reason for request:  patient is out of medication    Request date for pharmacy pick-up:  1/2/25    Next office visit date: 1/20/2025    Beeswax, Cortisone acetate, Hydrocortisone, Morphine, and Penicillins

## 2025-01-03 NOTE — TELEPHONE ENCOUNTER
Patient calling back.  She is unable to do a follow up today due to weather.  She lives in [place].    Patient asking for short supply till appt on Tuesday/    Please advise

## 2025-01-03 NOTE — TELEPHONE ENCOUNTER
I will get a message to Dr Carolina, he normally does not do short supply or send medications with out seeing the patient.

## 2025-01-07 ENCOUNTER — OFFICE VISIT (OUTPATIENT)
Age: 55
End: 2025-01-07
Payer: COMMERCIAL

## 2025-01-07 VITALS — TEMPERATURE: 96.8 F | HEIGHT: 65 IN | BODY MASS INDEX: 19.16 KG/M2 | WEIGHT: 115 LBS

## 2025-01-07 DIAGNOSIS — M96.1 LUMBAR POST-LAMINECTOMY SYNDROME: ICD-10-CM

## 2025-01-07 DIAGNOSIS — M17.11 OSTEOARTHRITIS OF RIGHT KNEE, UNSPECIFIED OSTEOARTHRITIS TYPE: ICD-10-CM

## 2025-01-07 DIAGNOSIS — M46.1 BILATERAL SACROILIITIS (HCC): ICD-10-CM

## 2025-01-07 DIAGNOSIS — M47.817 LUMBOSACRAL SPONDYLOSIS WITHOUT MYELOPATHY: Primary | ICD-10-CM

## 2025-01-07 DIAGNOSIS — M54.16 LUMBAR RADICULOPATHY: ICD-10-CM

## 2025-01-07 DIAGNOSIS — M17.12 PRIMARY OSTEOARTHRITIS OF LEFT KNEE: ICD-10-CM

## 2025-01-07 DIAGNOSIS — M16.11 PRIMARY OSTEOARTHRITIS OF RIGHT HIP: ICD-10-CM

## 2025-01-07 PROCEDURE — 99215 OFFICE O/P EST HI 40 MIN: CPT | Performed by: PAIN MEDICINE

## 2025-01-07 PROCEDURE — G8427 DOCREV CUR MEDS BY ELIG CLIN: HCPCS | Performed by: PAIN MEDICINE

## 2025-01-07 PROCEDURE — 3017F COLORECTAL CA SCREEN DOC REV: CPT | Performed by: PAIN MEDICINE

## 2025-01-07 PROCEDURE — M1308 PR FLU IMMUNIZE NO ADMIN: HCPCS | Performed by: PAIN MEDICINE

## 2025-01-07 PROCEDURE — 1036F TOBACCO NON-USER: CPT | Performed by: PAIN MEDICINE

## 2025-01-07 PROCEDURE — 99213 OFFICE O/P EST LOW 20 MIN: CPT | Performed by: PAIN MEDICINE

## 2025-01-07 PROCEDURE — G8420 CALC BMI NORM PARAMETERS: HCPCS | Performed by: PAIN MEDICINE

## 2025-01-07 RX ORDER — OXYCODONE HYDROCHLORIDE 5 MG/1
5 TABLET ORAL EVERY 8 HOURS PRN
Qty: 84 TABLET | Refills: 0 | Status: SHIPPED | OUTPATIENT
Start: 2025-01-07 | End: 2025-03-30

## 2025-01-07 NOTE — PROGRESS NOTES
History of Present Illness     Patient Identification  Emily Howard is a 54 y.o. female.    Patient information was obtained from patient.      Chief Complaint   Chief Complaint   Patient presents with    Back Pain    Hip Pain       Patient 53-year-old female here for follow up bilateral SI Joint injections for low back pain with increased pain, with a history of COPD, cancer, asthma, anxiety  known to me complaint of back pain to her rt foot  and bilateral knees. Had a fall by slipping on Garbage had to go to ED and was given a toradol shot. Had an admit 2 weeks ago with a TIA. This is a result of no known injury. Onset of pain was several years ago and has been unchanged since. The pain is located in diffuse lower back, described as aching, dull, and stabbing and rated as moderate and severe, without radiation. Symptoms include incontinence of bowels. Occational weakness because of balance issues Occationaly her bladder   The patient denies other injuries. Care prior to arrival consisted of NSAID and acetaminophen with minimal relief.  Seen Ortho cannot have surgery as she cannot  have some one to take care of Her. Started Oxycodone with GI Side effects was admitted to hospital she attributes to Tylenol. Having tough time doing her Job.    No past medical history on file.  No family history on file.  Current Outpatient Medications   Medication Sig Dispense Refill    QUEtiapine (SEROQUEL XR) 50 MG extended release tablet Take 1 tablet by mouth nightly      azithromycin (ZITHROMAX) 250 MG tablet TAKE 2 TABLETS BY MOUTH TODAY, THEN TAKE 1 TABLET DAILY FOR 4 DAYS AS DIRECTED      fluticasone (FLONASE) 50 MCG/ACT nasal spray SPRAY 2 SPRAY INTO BOTH NOSTRILS ONCE A DAY INSTILL 2 SPRAYS INTO EACH NOSTRIL ONCE DAILY      ipratropium 0.5 mg-albuterol 2.5 mg (DUONEB) 0.5-2.5 (3) MG/3ML SOLN nebulizer solution Inhale 3 mLs into the lungs every 6 hours as needed      methocarbamol (ROBAXIN) 750 MG tablet TAKE 1 TABLET BY

## 2025-01-17 ENCOUNTER — TELEPHONE (OUTPATIENT)
Age: 55
End: 2025-01-17

## 2025-01-17 NOTE — TELEPHONE ENCOUNTER
RECEIVED DENIAL FOR RIGHT L345 MBB:    YOUR REQUEST FOR PAIN SHOTS(FACET JOINT INJS) CANNOT BE APPROVED. YOU ARE 54 YEARS OLD WITH LOWER BACK PAIN. NOTES DO NOT SHOW YOU HAVE BEEN TREATED WITH THERAPY OR HOME EXERCISES FOR AT LEAST 6 WEEKS IN THE LAST 6 MONTHS.       DR. DANIEL PLEASE ORDER PHYSICAL THERAPY     ROUTE TO [place], TO INFORM PATIENT OF DENIAL AND PLEASE SCHEDULE PHYSICAL THERAPY. WE NEED 6 WEEKS DONE BEFORE THE DEADLINE FOR APPEAL ON 03/15/2025

## 2025-02-04 ENCOUNTER — OFFICE VISIT (OUTPATIENT)
Age: 55
End: 2025-02-04
Payer: COMMERCIAL

## 2025-02-04 VITALS — HEIGHT: 65 IN | BODY MASS INDEX: 19.16 KG/M2 | TEMPERATURE: 97.3 F | WEIGHT: 115 LBS

## 2025-02-04 DIAGNOSIS — M46.1 BILATERAL SACROILIITIS (HCC): ICD-10-CM

## 2025-02-04 PROCEDURE — G8427 DOCREV CUR MEDS BY ELIG CLIN: HCPCS | Performed by: PAIN MEDICINE

## 2025-02-04 PROCEDURE — G8420 CALC BMI NORM PARAMETERS: HCPCS | Performed by: PAIN MEDICINE

## 2025-02-04 PROCEDURE — 1036F TOBACCO NON-USER: CPT | Performed by: PAIN MEDICINE

## 2025-02-04 PROCEDURE — 99214 OFFICE O/P EST MOD 30 MIN: CPT | Performed by: PAIN MEDICINE

## 2025-02-04 PROCEDURE — 99213 OFFICE O/P EST LOW 20 MIN: CPT | Performed by: PAIN MEDICINE

## 2025-02-04 PROCEDURE — 3017F COLORECTAL CA SCREEN DOC REV: CPT | Performed by: PAIN MEDICINE

## 2025-02-04 RX ORDER — OXYCODONE HYDROCHLORIDE 5 MG/1
5 TABLET ORAL EVERY 8 HOURS PRN
Qty: 84 TABLET | Refills: 0 | Status: SHIPPED | OUTPATIENT
Start: 2025-02-04 | End: 2025-04-27

## 2025-02-04 NOTE — PROGRESS NOTES
History of Present Illness     Patient Identification  Emily Howard is a 54 y.o. female.    Patient information was obtained from patient.      Chief Complaint   Chief Complaint   Patient presents with    Follow-up    Back Pain    Arm Injury     Left arm injury       Patient 53-year-old female here for follow up bilateral SI Joint injections for low back pain with increased pain, with a history of COPD, cancer, asthma, anxiety  known to me complaint of back pain to her rt foot  and bilateral knees. Had a fall by slipping on Garbage had to go to ED and was given a toradol shot. Had an admit 2 weeks ago with a TIA. This is a result of no known injury. Onset of pain was several years ago and has been unchanged since. The pain is located in diffuse lower back, described as aching, dull, and stabbing and rated as moderate and severe, without radiation. Symptoms include incontinence of bowels. Occational weakness because of balance issues Occationaly her bladder   The patient denies other injuries. Care prior to arrival consisted of NSAID and acetaminophen with minimal relief.  Seen Ortho cannot have surgery as she cannot  have some one to take care of Her. Started Oxycodone with GI Side effects was admitted to hospital she attributes to Tylenol. Having tough time doing her Job. Insurance requires PT before fact RFAs.    History reviewed. No pertinent past medical history.  History reviewed. No pertinent family history.  Current Outpatient Medications   Medication Sig Dispense Refill    oxyCODONE (ROXICODONE) 5 MG immediate release tablet Take 1 tablet by mouth every 8 hours as needed for Pain for up to 82 days. Intended supply: 3 days. Take lowest dose possible to manage pain Max Daily Amount: 15 mg 84 tablet 0    QUEtiapine (SEROQUEL XR) 50 MG extended release tablet Take 1 tablet by mouth nightly      azithromycin (ZITHROMAX) 250 MG tablet TAKE 2 TABLETS BY MOUTH TODAY, THEN TAKE 1 TABLET DAILY FOR 4 DAYS AS DIRECTED

## 2025-03-04 ENCOUNTER — OFFICE VISIT (OUTPATIENT)
Age: 55
End: 2025-03-04
Payer: COMMERCIAL

## 2025-03-04 VITALS — HEIGHT: 65 IN | BODY MASS INDEX: 19.16 KG/M2 | WEIGHT: 115 LBS | TEMPERATURE: 97.2 F

## 2025-03-04 DIAGNOSIS — M46.1 BILATERAL SACROILIITIS: ICD-10-CM

## 2025-03-04 PROCEDURE — 99213 OFFICE O/P EST LOW 20 MIN: CPT | Performed by: PAIN MEDICINE

## 2025-03-04 PROCEDURE — G8420 CALC BMI NORM PARAMETERS: HCPCS | Performed by: PAIN MEDICINE

## 2025-03-04 PROCEDURE — 1036F TOBACCO NON-USER: CPT | Performed by: PAIN MEDICINE

## 2025-03-04 PROCEDURE — G8427 DOCREV CUR MEDS BY ELIG CLIN: HCPCS | Performed by: PAIN MEDICINE

## 2025-03-04 PROCEDURE — 3017F COLORECTAL CA SCREEN DOC REV: CPT | Performed by: PAIN MEDICINE

## 2025-03-04 PROCEDURE — 99214 OFFICE O/P EST MOD 30 MIN: CPT

## 2025-03-04 RX ORDER — OXYCODONE HYDROCHLORIDE 5 MG/1
5 TABLET ORAL EVERY 8 HOURS PRN
Qty: 84 TABLET | Refills: 0 | Status: SHIPPED | OUTPATIENT
Start: 2025-03-04 | End: 2025-05-25

## 2025-03-04 ASSESSMENT — ENCOUNTER SYMPTOMS
EYES NEGATIVE: 1
RESPIRATORY NEGATIVE: 1
GASTROINTESTINAL NEGATIVE: 1
ALLERGIC/IMMUNOLOGIC NEGATIVE: 1

## 2025-03-04 NOTE — PROGRESS NOTES
Ear: Ear canal normal.      Left Ear: Ear canal normal.      Nose: Nose normal.      Mouth/Throat:      Mouth: Mucous membranes are moist.   Eyes:      Extraocular Movements: Extraocular movements intact.      Conjunctiva/sclera: Conjunctivae normal.      Pupils: Pupils are equal, round, and reactive to light.   Cardiovascular:      Rate and Rhythm: Normal rate and regular rhythm.      Pulses: Normal pulses.      Heart sounds: Normal heart sounds.   Pulmonary:      Effort: Pulmonary effort is normal.      Breath sounds: Normal breath sounds.   Abdominal:      General: Abdomen is flat. Bowel sounds are normal.      Palpations: Abdomen is soft.   Musculoskeletal:         General: Normal range of motion.      Cervical back: Normal range of motion and neck supple.      Comments: Good Gait able to walk on Toes and heels, Good ROM Of flexion pain on Extension, Tender facets both sides more right side, Tender SI Joints both sides, Pain on Gainslins, Mild pain on Hip grinding, Pain on pelvic compression and distractions,  Mild Hemiparesis Left side. Moderate pain Rt SLR.   Skin:     General: Skin is warm.      Capillary Refill: Capillary refill takes less than 2 seconds.   Neurological:      General: No focal deficit present.      Mental Status: She is alert and oriented to person, place, and time. Mental status is at baseline.   Psychiatric:         Mood and Affect: Mood normal.         Behavior: Behavior normal.         Thought Content: Thought content normal.         Judgment: Judgment normal.     Opioid Risk Tool: Negative if blank [], Positive if []   [] All negative      Family Hx of Substance Abuse ?        [x] ETOH                  (Men 3.0; Women 1.0)  [] Illegal Drugs       (Men 3.0; Women 2.0)  [x] Rx Drug Abuse  (Men 4.0; Women 4.0)                        Personal History of Substance Abuse ?     [] ETOH                (Men 3.0; Women 3.0)  [] Illegal Drugs     (Men 4.0; Women 4.0)  [] Rx Drug Abuse (Men 5.0;

## 2025-04-01 ENCOUNTER — OFFICE VISIT (OUTPATIENT)
Age: 55
End: 2025-04-01
Payer: COMMERCIAL

## 2025-04-01 VITALS
TEMPERATURE: 97.5 F | HEIGHT: 65 IN | WEIGHT: 115 LBS | SYSTOLIC BLOOD PRESSURE: 130 MMHG | BODY MASS INDEX: 19.16 KG/M2 | DIASTOLIC BLOOD PRESSURE: 60 MMHG

## 2025-04-01 DIAGNOSIS — M54.16 LUMBAR RADICULOPATHY: ICD-10-CM

## 2025-04-01 DIAGNOSIS — M46.1 BILATERAL SACROILIITIS: Primary | ICD-10-CM

## 2025-04-01 PROCEDURE — 1036F TOBACCO NON-USER: CPT | Performed by: PAIN MEDICINE

## 2025-04-01 PROCEDURE — 99213 OFFICE O/P EST LOW 20 MIN: CPT | Performed by: PAIN MEDICINE

## 2025-04-01 PROCEDURE — G8420 CALC BMI NORM PARAMETERS: HCPCS | Performed by: PAIN MEDICINE

## 2025-04-01 PROCEDURE — 3017F COLORECTAL CA SCREEN DOC REV: CPT | Performed by: PAIN MEDICINE

## 2025-04-01 PROCEDURE — G8427 DOCREV CUR MEDS BY ELIG CLIN: HCPCS | Performed by: PAIN MEDICINE

## 2025-04-01 PROCEDURE — 99212 OFFICE O/P EST SF 10 MIN: CPT | Performed by: PAIN MEDICINE

## 2025-04-01 RX ORDER — OXYCODONE HYDROCHLORIDE 5 MG/1
5 TABLET ORAL EVERY 8 HOURS PRN
Qty: 84 TABLET | Refills: 0 | Status: SHIPPED | OUTPATIENT
Start: 2025-04-01 | End: 2025-06-22

## 2025-04-01 ASSESSMENT — ENCOUNTER SYMPTOMS
GASTROINTESTINAL NEGATIVE: 1
ALLERGIC/IMMUNOLOGIC NEGATIVE: 1
EYES NEGATIVE: 1
RESPIRATORY NEGATIVE: 1

## 2025-04-01 NOTE — PROGRESS NOTES
History of Present Illness     Patient Identification  Emily Howard is a 54 y.o. female.    Patient information was obtained from patient.      Chief Complaint   Chief Complaint   Patient presents with    Follow-up    Back Pain       Patient 53-year-old female here for follow up bilateral SI Joint injections for low back pain with increased pain, with a history of COPD, cancer, asthma, anxiety  known to me complaint of back pain to her rt foot  and bilateral knees.  This is a result of no known injury. Onset of pain was several years ago and has been unchanged since. The pain is located in diffuse lower back, described as aching, dull, and stabbing and rated as moderate and severe, without radiation. Symptoms include incontinence of bowels. Occational weakness because of balance issues Occationaly her bladder   The patient denies other injuries. Care prior to arrival consisted of NSAID and acetaminophen with minimal relief.  Seen Ortho Had a complicated  surgery with some relief. Started Oxycodone with GI Side effects was admitted to hospital she attributes to Tylenol. Having tough time doing her Job. Insurance requires PT before fact RFAs.    History reviewed. No pertinent past medical history.  History reviewed. No pertinent family history.  Current Outpatient Medications   Medication Sig Dispense Refill    oxyCODONE (ROXICODONE) 5 MG immediate release tablet Take 1 tablet by mouth every 8 hours as needed for Pain for up to 82 days. Intended supply: 3 days. Take lowest dose possible to manage pain Max Daily Amount: 15 mg 84 tablet 0    QUEtiapine (SEROQUEL XR) 50 MG extended release tablet Take 1 tablet by mouth nightly      azithromycin (ZITHROMAX) 250 MG tablet TAKE 2 TABLETS BY MOUTH TODAY, THEN TAKE 1 TABLET DAILY FOR 4 DAYS AS DIRECTED      fluticasone (FLONASE) 50 MCG/ACT nasal spray SPRAY 2 SPRAY INTO BOTH NOSTRILS ONCE A DAY INSTILL 2 SPRAYS INTO EACH NOSTRIL ONCE DAILY      ipratropium 0.5 mg-albuterol

## 2025-04-29 DIAGNOSIS — M46.1 BILATERAL SACROILIITIS: ICD-10-CM

## 2025-04-29 NOTE — TELEPHONE ENCOUNTER
Patients Father in law passed away today.  Patient r/s for 5/2 asking for short supply of pain med    Requested Prescriptions     Pending Prescriptions Disp Refills    oxyCODONE (ROXICODONE) 5 MG immediate release tablet 84 tablet 0     Sig: Take 1 tablet by mouth every 8 hours as needed for Pain for up to 3 days. Intended supply: 3 days. Take lowest dose possible to manage pain Max Daily Amount: 15 mg       Patient last seen on:  4/1    Date of last surgery:  12/19/24    Reason for request:  patient r/s till 5/2 patient requesting short supply    Next office visit date: 5/2/2025    Bee venom, Corticosteroids, Morphine, Other, Beeswax, Doxycycline, Penicillins, Cortisone, Cortisone acetate, Hydrocortisone, and Nsaids

## 2025-04-30 RX ORDER — OXYCODONE HYDROCHLORIDE 5 MG/1
5 TABLET ORAL EVERY 8 HOURS PRN
Qty: 84 TABLET | Refills: 0 | OUTPATIENT
Start: 2025-04-30 | End: 2025-05-03

## 2025-05-01 DIAGNOSIS — M46.1 BILATERAL SACROILIITIS: ICD-10-CM

## 2025-05-01 RX ORDER — OXYCODONE HYDROCHLORIDE 5 MG/1
5 TABLET ORAL EVERY 8 HOURS PRN
Qty: 84 TABLET | Refills: 0 | OUTPATIENT
Start: 2025-05-01 | End: 2025-07-22

## 2025-05-01 RX ORDER — OXYCODONE HYDROCHLORIDE 5 MG/1
5 TABLET ORAL EVERY 8 HOURS PRN
Qty: 6 TABLET | Refills: 0 | OUTPATIENT
Start: 2025-05-01 | End: 2025-05-03

## 2025-05-01 NOTE — TELEPHONE ENCOUNTER
Requested Prescriptions     Pending Prescriptions Disp Refills    oxyCODONE (ROXICODONE) 5 MG immediate release tablet 6 tablet 0     Sig: Take 1 tablet by mouth every 8 hours as needed for Pain for up to 2 days. Intended supply: 3 days. Take lowest dose possible to manage pain Max Daily Amount: 15 mg       Patient last seen on:  4/1/2025    Date of last surgery:  n/a    Date of last refill:  4/1/2025    Reason for request:  NEEDS A REFILL UNTIL TOMORROW    Request date for pharmacy pick-up:  05/1/2025    Next office visit date: 5/2/2025    Bee venom, Corticosteroids, Morphine, Other, Beeswax, Doxycycline, Penicillins, Cortisone, Cortisone acetate, Hydrocortisone, and Nsaids

## 2025-05-02 ENCOUNTER — OFFICE VISIT (OUTPATIENT)
Age: 55
End: 2025-05-02
Payer: COMMERCIAL

## 2025-05-02 VITALS
BODY MASS INDEX: 17.99 KG/M2 | WEIGHT: 108 LBS | SYSTOLIC BLOOD PRESSURE: 120 MMHG | TEMPERATURE: 97.4 F | DIASTOLIC BLOOD PRESSURE: 82 MMHG | HEIGHT: 65 IN | HEART RATE: 107 BPM | OXYGEN SATURATION: 93 %

## 2025-05-02 DIAGNOSIS — M46.1 BILATERAL SACROILIITIS: ICD-10-CM

## 2025-05-02 PROCEDURE — 99212 OFFICE O/P EST SF 10 MIN: CPT | Performed by: PAIN MEDICINE

## 2025-05-02 RX ORDER — OXYCODONE HYDROCHLORIDE 5 MG/1
5 TABLET ORAL EVERY 8 HOURS PRN
Qty: 84 TABLET | Refills: 0 | Status: SHIPPED | OUTPATIENT
Start: 2025-05-02 | End: 2025-07-23

## 2025-05-02 ASSESSMENT — ENCOUNTER SYMPTOMS
ALLERGIC/IMMUNOLOGIC NEGATIVE: 1
GASTROINTESTINAL NEGATIVE: 1
RESPIRATORY NEGATIVE: 1
EYES NEGATIVE: 1

## 2025-05-02 NOTE — PROGRESS NOTES
History of Present Illness     Patient Identification  Emily Howard is a 54 y.o. female.    Patient information was obtained from patient.      Chief Complaint   Chief Complaint   Patient presents with    Follow-up     Pt is in extreme pain        Patient 53-year-old female here for follow up bilateral SI Joint injections for low back pain with increased pain, with a history of COPD, cancer, asthma, anxiety  known to me complaint of back pain to her rt foot  and bilateral knees. Pain worse past 1 week in her legs and left back also complains of \" My vagina is numb\" This is a result of no known injury. Onset of pain was several years ago and has been unchanged since. The pain is located in diffuse lower back, described as aching, dull, and stabbing and rated as moderate and severe, 10/10 without radiation. Symptoms include incontinence of bowels. Occational weakness because of balance issues Occationaly her bladder is incontinent The patient denies other injuries. Care prior to arrival consisted of NSAID and acetaminophen with minimal relief. Seen Ortho Had a complicated  surgery with some relief. Started Oxycodone with GI Side effects was admitted to hospital she attributes to Tylenol. Having tough time doing her Job. Insurance requires PT before fact RFAs.    History reviewed. No pertinent past medical history.  History reviewed. No pertinent family history.  Current Outpatient Medications   Medication Sig Dispense Refill    oxyCODONE (ROXICODONE) 5 MG immediate release tablet Take 1 tablet by mouth every 8 hours as needed for Pain for up to 82 days. Intended supply: 3 days. Take lowest dose possible to manage pain Max Daily Amount: 15 mg 84 tablet 0    QUEtiapine (SEROQUEL XR) 50 MG extended release tablet Take 1 tablet by mouth nightly      azithromycin (ZITHROMAX) 250 MG tablet TAKE 2 TABLETS BY MOUTH TODAY, THEN TAKE 1 TABLET DAILY FOR 4 DAYS AS DIRECTED      fluticasone (FLONASE) 50 MCG/ACT nasal spray SPRAY 2

## 2025-06-30 ENCOUNTER — OFFICE VISIT (OUTPATIENT)
Age: 55
End: 2025-06-30
Payer: COMMERCIAL

## 2025-06-30 VITALS
HEART RATE: 133 BPM | BODY MASS INDEX: 17.99 KG/M2 | TEMPERATURE: 97 F | SYSTOLIC BLOOD PRESSURE: 132 MMHG | WEIGHT: 108 LBS | OXYGEN SATURATION: 100 % | HEIGHT: 65 IN | DIASTOLIC BLOOD PRESSURE: 72 MMHG

## 2025-06-30 DIAGNOSIS — M46.1 BILATERAL SACROILIITIS: ICD-10-CM

## 2025-06-30 PROCEDURE — 99212 OFFICE O/P EST SF 10 MIN: CPT | Performed by: PAIN MEDICINE

## 2025-06-30 PROCEDURE — G8419 CALC BMI OUT NRM PARAM NOF/U: HCPCS | Performed by: PAIN MEDICINE

## 2025-06-30 PROCEDURE — 1036F TOBACCO NON-USER: CPT | Performed by: PAIN MEDICINE

## 2025-06-30 PROCEDURE — G8427 DOCREV CUR MEDS BY ELIG CLIN: HCPCS | Performed by: PAIN MEDICINE

## 2025-06-30 PROCEDURE — 3017F COLORECTAL CA SCREEN DOC REV: CPT | Performed by: PAIN MEDICINE

## 2025-06-30 PROCEDURE — 99213 OFFICE O/P EST LOW 20 MIN: CPT | Performed by: PAIN MEDICINE

## 2025-06-30 RX ORDER — OXYCODONE HYDROCHLORIDE 5 MG/1
5 TABLET ORAL EVERY 8 HOURS PRN
Qty: 84 TABLET | Refills: 0 | Status: CANCELLED | OUTPATIENT
Start: 2025-06-30 | End: 2025-09-20

## 2025-06-30 NOTE — PROGRESS NOTES
History of Present Illness     Patient Identification  Emily Howard is a 54 y.o. female.    Patient information was obtained from patient.      Chief Complaint   Chief Complaint   Patient presents with    Follow-up     Patient states she is doing worse since last visit.        Patient 53-year-old female here for follow up bilateral SI Joint injections for low back pain with increased pain, with a history of COPD, cancer, asthma, anxiety  known to me complaint of back pain to her rt foot  and bilateral knees. Pain worse past 1 week in her legs and left back also complains of \" My vagina is numb\" This is a result of no known injury. Onset of pain was several years ago and has been unchanged since. The pain is located in diffuse lower back, described as aching, dull, and stabbing and rated as moderate and severe, 10/10 without radiation. Symptoms include incontinence of bowels. Occational weakness because of balance issues Occationaly her bladder is incontinent The patient denies other injuries. Care prior to arrival consisted of NSAID and acetaminophen with minimal relief. Seen Ortho Had a complicated  surgery with some relief. Started Oxycodone with GI Side effects was admitted to hospital she attributes to Tylenol. Having tough time doing her Job. Insurance requires PT before fact RFAs.    History reviewed. No pertinent past medical history.  History reviewed. No pertinent family history.  Current Outpatient Medications   Medication Sig Dispense Refill    oxyCODONE (ROXICODONE) 5 MG immediate release tablet Take 1 tablet by mouth every 8 hours as needed for Pain for up to 82 days. Intended supply: 3 days. Take lowest dose possible to manage pain Max Daily Amount: 15 mg 84 tablet 0    QUEtiapine (SEROQUEL XR) 50 MG extended release tablet Take 1 tablet by mouth nightly      azithromycin (ZITHROMAX) 250 MG tablet TAKE 2 TABLETS BY MOUTH TODAY, THEN TAKE 1 TABLET DAILY FOR 4 DAYS AS DIRECTED      fluticasone (FLONASE)